# Patient Record
Sex: MALE | Race: WHITE | HISPANIC OR LATINO
[De-identification: names, ages, dates, MRNs, and addresses within clinical notes are randomized per-mention and may not be internally consistent; named-entity substitution may affect disease eponyms.]

---

## 2017-02-28 PROBLEM — Z00.00 ENCOUNTER FOR PREVENTIVE HEALTH EXAMINATION: Status: ACTIVE | Noted: 2017-02-28

## 2017-03-05 ENCOUNTER — FORM ENCOUNTER (OUTPATIENT)
Age: 62
End: 2017-03-05

## 2017-03-06 ENCOUNTER — OUTPATIENT (OUTPATIENT)
Dept: OUTPATIENT SERVICES | Facility: HOSPITAL | Age: 62
LOS: 1 days | End: 2017-03-06
Payer: COMMERCIAL

## 2017-03-06 ENCOUNTER — APPOINTMENT (OUTPATIENT)
Dept: MRI IMAGING | Facility: CLINIC | Age: 62
End: 2017-03-06

## 2017-03-06 DIAGNOSIS — R97.20 ELEVATED PROSTATE SPECIFIC ANTIGEN [PSA]: ICD-10-CM

## 2017-03-06 PROCEDURE — 82565 ASSAY OF CREATININE: CPT

## 2017-03-06 PROCEDURE — 72197 MRI PELVIS W/O & W/DYE: CPT

## 2017-03-06 PROCEDURE — 72197 MRI PELVIS W/O & W/DYE: CPT | Mod: 26

## 2017-03-06 PROCEDURE — A9585: CPT

## 2019-09-05 ENCOUNTER — APPOINTMENT (OUTPATIENT)
Dept: GASTROENTEROLOGY | Facility: CLINIC | Age: 64
End: 2019-09-05

## 2021-07-14 ENCOUNTER — APPOINTMENT (OUTPATIENT)
Dept: UROLOGY | Facility: CLINIC | Age: 66
End: 2021-07-14
Payer: MEDICARE

## 2021-07-14 VITALS
DIASTOLIC BLOOD PRESSURE: 94 MMHG | WEIGHT: 180 LBS | BODY MASS INDEX: 27.28 KG/M2 | OXYGEN SATURATION: 97 % | HEART RATE: 67 BPM | SYSTOLIC BLOOD PRESSURE: 143 MMHG | HEIGHT: 68 IN

## 2021-07-14 PROCEDURE — 51798 US URINE CAPACITY MEASURE: CPT

## 2021-07-14 PROCEDURE — 99204 OFFICE O/P NEW MOD 45 MIN: CPT | Mod: 25

## 2021-07-14 RX ORDER — TAMSULOSIN HYDROCHLORIDE 0.4 MG/1
0.4 CAPSULE ORAL
Qty: 30 | Refills: 5 | Status: ACTIVE | COMMUNITY
Start: 2021-07-14 | End: 1900-01-01

## 2021-07-14 NOTE — LETTER BODY
[Dear  ___] : Dear  [unfilled], [Consult Letter:] : I had the pleasure of evaluating your patient, [unfilled]. [( Thank you for referring [unfilled] for consultation for _____ )] : Thank you for referring [unfilled] for consultation for [unfilled] [Please see my note below.] : Please see my note below. [Consult Closing:] : Thank you very much for allowing me to participate in the care of this patient.  If you have any questions, please do not hesitate to contact me. [Sincerely,] : Sincerely, [FreeTextEntry3] : Delmar Matute MD\par  of Urology\par [facility]\par \par Offices:\par [facility] @\par [address], [place] [address]\par and\par [address], [place] [address], [address]\par and\par [address] [address]\par \par TEL: [phone]\par FAX: [phone]

## 2021-07-14 NOTE — ASSESSMENT
[FreeTextEntry1] : Reviewed outside records. \par PSA: 9.0(6/29/21)<--8.3(1 year ago). \par \par Benign Prostatic Hyperplasia:\par Will get Urinalysis with reflex Urine culture. \par Discussed that he has LUTs which probably are secondary to enlarged prostate and that I would like to address those first with alpha blockers. Patient agreeable. \par Prescribed Flomax. Explained common side effects: nasal congestion, cough, muscle pain, back pain, dizziness, orthostatic hypotension, somnolence, retrograde ejaculation, decreased libido and erectile dysfunction. \par \par Elevated PSA:\par Discussed with the patient that PSA is a substance produced by the prostate gland. Elevated PSA levels may indicate a noncancerous condition such as prostatitis, or an enlarged prostate but it may also indicate prostate cancer.\par Discussed PSA screening and latest recommendations/guidelines- USPTF and AUA. \par Explained that only way to rule out Prostate cancer in a patient with elevated PSA, increased PSA velocity or abnormal digital rectal exam is to do Prostate needle biopsy.\par Total and Free PSA 1 week before next appointment. \par \par Return to office in 6 weeks or sooner if any issues- will do Uroflo/PVR.

## 2021-07-14 NOTE — HISTORY OF PRESENT ILLNESS
[FreeTextEntry1] : 64 yo male presents for Elevated PSA. \par Recently had PSA checked and was told is elevated. \par Denies any recent unintentional weight loss, night sweats and new bone or back pain.\par Family history of Prostate cancer: no. \par Has had negative prostate biopsy in the past and negative MRI Prostate. \par Reports variable stream, urinates every 1-2 hours or so during the day. Nocturia of 3 x. \par Endorses hesitancy, straining, intermittency, urgency, incontinence, sense of incomplete emptying.\par Denies dysuria, hematuria, lower abdominal or flank pain, fever, chills or rigors.\par Tried a medicine in the past, had poorer erections so stopped. \par Normal erections and libido. \par Has had negative work up for Hematuria in the past. \par \par \par \par

## 2021-07-14 NOTE — REVIEW OF SYSTEMS
[Cough] : cough [Discharge from urine canal] : discharge from urine canal [Wake up at night to urinate  How many times?  ___] : wakes up to urinate [unfilled] times during the night [Strain or push to urinate] : strain or push to urinate [Wait a long time to urinate] : waits a long time to urinate [Slow urine stream] : slow urine stream [Interrupted urine stream] : interrupted urine stream [Leakage of urine with urgency] : leakage of urine with urgency [Negative] : Heme/Lymph

## 2021-07-15 LAB
APPEARANCE: CLEAR
BILIRUBIN URINE: NEGATIVE
BLOOD URINE: NEGATIVE
COLOR: COLORLESS
GLUCOSE QUALITATIVE U: NEGATIVE
KETONES URINE: NEGATIVE
LEUKOCYTE ESTERASE URINE: NEGATIVE
NITRITE URINE: NEGATIVE
PH URINE: 7
PROTEIN URINE: NEGATIVE
SPECIFIC GRAVITY URINE: 1
UROBILINOGEN URINE: NORMAL

## 2021-08-19 LAB
PSA FREE FLD-MCNC: 16 %
PSA FREE SERPL-MCNC: 1.82 NG/ML
PSA SERPL-MCNC: 11.6 NG/ML

## 2021-08-25 ENCOUNTER — APPOINTMENT (OUTPATIENT)
Dept: UROLOGY | Facility: CLINIC | Age: 66
End: 2021-08-25

## 2021-09-01 ENCOUNTER — APPOINTMENT (OUTPATIENT)
Dept: UROLOGY | Facility: CLINIC | Age: 66
End: 2021-09-01
Payer: MEDICARE

## 2021-09-01 VITALS — TEMPERATURE: 98.1 F

## 2021-09-01 DIAGNOSIS — R97.20 ELEVATED PROSTATE, SPECIFIC ANTIGEN [PSA]: ICD-10-CM

## 2021-09-01 DIAGNOSIS — N13.8 BENIGN PROSTATIC HYPERPLASIA WITH LOWER URINARY TRACT SYMPMS: ICD-10-CM

## 2021-09-01 DIAGNOSIS — N40.1 BENIGN PROSTATIC HYPERPLASIA WITH LOWER URINARY TRACT SYMPMS: ICD-10-CM

## 2021-09-01 DIAGNOSIS — R39.15 URGENCY OF URINATION: ICD-10-CM

## 2021-09-01 PROCEDURE — 99214 OFFICE O/P EST MOD 30 MIN: CPT | Mod: 25

## 2021-09-01 PROCEDURE — 51798 US URINE CAPACITY MEASURE: CPT

## 2021-09-01 PROCEDURE — 51741 ELECTRO-UROFLOWMETRY FIRST: CPT

## 2021-09-10 NOTE — LETTER BODY
[Dear  ___] : Dear  [unfilled], [Courtesy Letter:] : I had the pleasure of seeing your patient, [unfilled], in my office today. [Please see my note below.] : Please see my note below. [Sincerely,] : Sincerely, [FreeTextEntry3] : Delmar Matute MD\par  of Urology\par [facility]\par \par Offices:\par [facility] @\par [address], [place] [address]\par and\par [address], [place] [address], [address]\par and\par [address] [address]\par \par TEL: [phone]\par FAX: [phone]

## 2021-09-10 NOTE — ASSESSMENT
[FreeTextEntry1] : Reviewed records. \par \par Benign Prostatic Hyperplasia:\par Repeat PVR: 85 ml. \par Discussed treatment options. Will continue Flomax.\par \par Urinary urgency:\par Discussed prescribing Oxybutynin. Pt agreeable. Explained side effects- dryness of eyes and mouth, thirst, dyspepsia, blurred vision, somnolence, insomnia, confusion, constipation and difficulty urinating. \par \par Elevated PSA:\par Will get MRI Prostate: \par \par Return to office after MRI: will do Uroflo/PVR.

## 2021-09-10 NOTE — HISTORY OF PRESENT ILLNESS
[FreeTextEntry1] : 66 yo male presents for follow up. \par Taking Flomax, no change in flow, urinates every 1-2 hours or so during the day. Nocturia of 2 x. \par Has urgency.  \par Complaining of retrograde ejaculation. \par Did not have MRI Prostate. \par Had MRI Abd by Gastroenterologist. \par \par Initially seen for Elevated PSA. \par Denied any recent unintentional weight loss, night sweats and new bone or back pain.\par Family history of Prostate cancer: no. \par Has had negative prostate biopsy in the past and negative MRI Prostate. \par Reported variable stream, urinates every 1-2 hours or so during the day. Nocturia of 3 x. \par Endorsed hesitancy, straining, intermittency, urgency, incontinence, sense of incomplete emptying.\par Denied dysuria, hematuria, lower abdominal or flank pain, fever, chills or rigors.\par Tried a medicine in the past, had poorer erections so stopped. \par Normal erections and libido. \par Has had negative work up for Hematuria in the past. \par \par \par \par

## 2021-09-22 ENCOUNTER — APPOINTMENT (OUTPATIENT)
Dept: UROLOGY | Facility: CLINIC | Age: 66
End: 2021-09-22

## 2021-09-23 ENCOUNTER — NON-APPOINTMENT (OUTPATIENT)
Age: 66
End: 2021-09-23

## 2021-09-24 RX ORDER — OXYBUTYNIN CHLORIDE 5 MG/1
5 TABLET, EXTENDED RELEASE ORAL
Qty: 90 | Refills: 0 | Status: ACTIVE | COMMUNITY
Start: 2021-09-01 | End: 1900-01-01